# Patient Record
Sex: FEMALE | Race: WHITE | NOT HISPANIC OR LATINO | Employment: STUDENT | URBAN - NONMETROPOLITAN AREA
[De-identification: names, ages, dates, MRNs, and addresses within clinical notes are randomized per-mention and may not be internally consistent; named-entity substitution may affect disease eponyms.]

---

## 2023-02-24 PROBLEM — E28.2 POLYCYSTIC OVARIAN SYNDROME: Status: ACTIVE | Noted: 2023-02-24

## 2023-02-24 PROBLEM — G43.109 MIGRAINE WITH AURA AND WITHOUT STATUS MIGRAINOSUS, NOT INTRACTABLE: Status: ACTIVE | Noted: 2023-02-24

## 2023-02-24 PROBLEM — J30.9 ALLERGIC RHINITIS: Status: ACTIVE | Noted: 2023-02-24

## 2023-02-24 PROBLEM — J02.9 SORE THROAT: Status: ACTIVE | Noted: 2023-02-24

## 2023-02-24 PROBLEM — S62.339A BOXERS FRACTURE: Status: ACTIVE | Noted: 2023-02-24

## 2023-02-24 PROBLEM — F41.8 DEPRESSION WITH ANXIETY: Status: ACTIVE | Noted: 2023-02-24

## 2023-02-24 PROBLEM — R07.89 ATYPICAL CHEST PAIN: Status: ACTIVE | Noted: 2023-02-24

## 2023-02-24 PROBLEM — N91.3 PRIMARY OLIGOMENORRHEA: Status: ACTIVE | Noted: 2023-02-24

## 2023-02-24 PROBLEM — J02.0 STREPTOCOCCUS PHARYNGITIS: Status: ACTIVE | Noted: 2023-02-24

## 2023-02-24 RX ORDER — ETONOGESTREL 68 MG/1
1 IMPLANT SUBCUTANEOUS
COMMUNITY
Start: 2022-05-09 | End: 2025-05-09

## 2023-02-24 RX ORDER — NORTRIPTYLINE HYDROCHLORIDE 10 MG/1
1 CAPSULE ORAL NIGHTLY
COMMUNITY
Start: 2022-05-12 | End: 2024-02-13

## 2023-02-24 RX ORDER — VENLAFAXINE HYDROCHLORIDE 75 MG/1
1 CAPSULE, EXTENDED RELEASE ORAL DAILY
COMMUNITY
Start: 2021-07-01 | End: 2023-09-05

## 2023-03-09 ENCOUNTER — OFFICE VISIT (OUTPATIENT)
Dept: PRIMARY CARE | Facility: CLINIC | Age: 18
End: 2023-03-09
Payer: COMMERCIAL

## 2023-03-09 VITALS
HEART RATE: 88 BPM | BODY MASS INDEX: 35.3 KG/M2 | DIASTOLIC BLOOD PRESSURE: 70 MMHG | HEIGHT: 63 IN | SYSTOLIC BLOOD PRESSURE: 100 MMHG | WEIGHT: 199.2 LBS | OXYGEN SATURATION: 100 %

## 2023-03-09 DIAGNOSIS — M25.561 ACUTE PAIN OF RIGHT KNEE: Primary | ICD-10-CM

## 2023-03-09 PROCEDURE — 99213 OFFICE O/P EST LOW 20 MIN: CPT | Performed by: FAMILY MEDICINE

## 2023-03-09 PROCEDURE — 1036F TOBACCO NON-USER: CPT | Performed by: FAMILY MEDICINE

## 2023-03-09 ASSESSMENT — ENCOUNTER SYMPTOMS
TINGLING: 0
NUMBNESS: 0
INABILITY TO BEAR WEIGHT: 0
LOSS OF MOTION: 0
LOSS OF SENSATION: 0
MUSCLE WEAKNESS: 0

## 2023-03-09 NOTE — PROGRESS NOTES
"Subjective   Patient ID: Faby Nielson is a 18 y.o. female who presents for Knee Pain (B/L x2.5 MO).    Knee Pain   The incident occurred more than 1 week ago. The incident occurred at the gym. Injury mechanism: was doing tumbeling drill. The pain is present in the right knee. The quality of the pain is described as aching and shooting. The pain is at a severity of 6/10. The pain is mild. The pain has been Intermittent since onset. Pertinent negatives include no inability to bear weight, loss of motion, loss of sensation, muscle weakness, numbness or tingling. She has tried NSAIDs and ice for the symptoms.    Has tried bracing and KT tape, has always had some pain in knees, had an injury in 2020, felt like knee cap popped out of place.      Review of Systems   Neurological:  Negative for tingling and numbness.       Objective   /70   Pulse 88   Ht 1.6 m (5' 3\")   Wt 90.4 kg (199 lb 3.2 oz)   SpO2 100%   BMI 35.29 kg/m²     Physical Exam  Musculoskeletal:      Right knee: No swelling, deformity, effusion or crepitus. Normal range of motion. No LCL laxity, MCL laxity, ACL laxity or PCL laxity. Abnormal patellar mobility. Normal meniscus. Normal pulse.      Instability Tests: Anterior drawer test negative. Posterior drawer test negative. Anterior Lachman test negative. Medial Lisa test negative and lateral Lisa test negative.      Left knee: Normal.      Instability Tests: Anterior drawer test negative. Posterior drawer test negative. Anterior Lachman test negative. Medial Lisa test negative and lateral Lisa test negative.      Comments: Some laxity to the patella, mild pain with Apley's compression for the anterior pressure to the patella         Assessment/Plan   Problem List Items Addressed This Visit    None  Visit Diagnoses       Acute pain of right knee    -  Primary          Patient with a history of probable patellar dislocation in 2020, with had a pain in her right knee due to " increasing to tumbling incident in January 2023 patient's examination suggest an issue with her patella, will check an x-ray with sunrise views, refer to physical therapy for stability exercises, continue to use a compression sleeve with exercise, if not helping will consider referral to orthopedics

## 2023-04-06 ENCOUNTER — TELEPHONE (OUTPATIENT)
Dept: PRIMARY CARE | Facility: CLINIC | Age: 18
End: 2023-04-06
Payer: COMMERCIAL

## 2023-04-06 DIAGNOSIS — M25.561 RIGHT KNEE PAIN, UNSPECIFIED CHRONICITY: Primary | ICD-10-CM

## 2023-04-06 DIAGNOSIS — M25.561 ACUTE PAIN OF RIGHT KNEE: ICD-10-CM

## 2023-04-07 ENCOUNTER — TELEPHONE (OUTPATIENT)
Dept: PRIMARY CARE | Facility: CLINIC | Age: 18
End: 2023-04-07
Payer: COMMERCIAL

## 2023-04-10 ENCOUNTER — TELEPHONE (OUTPATIENT)
Dept: PRIMARY CARE | Facility: CLINIC | Age: 18
End: 2023-04-10
Payer: COMMERCIAL

## 2023-05-19 ENCOUNTER — OFFICE VISIT (OUTPATIENT)
Dept: PRIMARY CARE | Facility: CLINIC | Age: 18
End: 2023-05-19
Payer: COMMERCIAL

## 2023-05-19 VITALS
HEIGHT: 63 IN | WEIGHT: 199.3 LBS | DIASTOLIC BLOOD PRESSURE: 70 MMHG | BODY MASS INDEX: 35.31 KG/M2 | OXYGEN SATURATION: 96 % | HEART RATE: 81 BPM | SYSTOLIC BLOOD PRESSURE: 100 MMHG

## 2023-05-19 DIAGNOSIS — F41.8 DEPRESSION WITH ANXIETY: Primary | ICD-10-CM

## 2023-05-19 DIAGNOSIS — E66.01 SEVERE OBESITY (BMI 35.0-35.9 WITH COMORBIDITY) (MULTI): ICD-10-CM

## 2023-05-19 DIAGNOSIS — G43.109 MIGRAINE WITH AURA AND WITHOUT STATUS MIGRAINOSUS, NOT INTRACTABLE: ICD-10-CM

## 2023-05-19 PROBLEM — J02.9 SORE THROAT: Status: RESOLVED | Noted: 2023-02-24 | Resolved: 2023-05-19

## 2023-05-19 PROBLEM — R07.89 ATYPICAL CHEST PAIN: Status: RESOLVED | Noted: 2023-02-24 | Resolved: 2023-05-19

## 2023-05-19 PROBLEM — S62.339A BOXERS FRACTURE: Status: RESOLVED | Noted: 2023-02-24 | Resolved: 2023-05-19

## 2023-05-19 PROCEDURE — 1036F TOBACCO NON-USER: CPT | Performed by: FAMILY MEDICINE

## 2023-05-19 PROCEDURE — 3008F BODY MASS INDEX DOCD: CPT | Performed by: FAMILY MEDICINE

## 2023-05-19 PROCEDURE — 99213 OFFICE O/P EST LOW 20 MIN: CPT | Performed by: FAMILY MEDICINE

## 2023-05-19 ASSESSMENT — ENCOUNTER SYMPTOMS
NUMBNESS: 0
ABDOMINAL PAIN: 0
ARTHRALGIAS: 1
ACTIVITY CHANGE: 0
LIGHT-HEADEDNESS: 0
RHINORRHEA: 0
APPETITE CHANGE: 0
PALPITATIONS: 0
SHORTNESS OF BREATH: 0
WHEEZING: 0
NAUSEA: 0
DIARRHEA: 0
ABDOMINAL DISTENTION: 0
UNEXPECTED WEIGHT CHANGE: 0
HEADACHES: 0
DIFFICULTY URINATING: 0
ADENOPATHY: 0
DIZZINESS: 0
NERVOUS/ANXIOUS: 1
COUGH: 0
TROUBLE SWALLOWING: 0
FATIGUE: 0
VOMITING: 0
CONSTIPATION: 0

## 2023-05-19 NOTE — PROGRESS NOTES
"Subjective   Patient ID: Faby Nielson is a 18 y.o. female who presents for 6 MO F/U.    HPI   Knee pain still an issue, doing PT (not much help), using Meloxicam   Migraines better, has not had much recently, Nurtec helps when uses  Sleeping OK at night, using melatonin at night  Emotionally feeling anxious at times, having vivid dreams  School passed all but one class       Review of Systems   Constitutional:  Negative for activity change, appetite change, fatigue and unexpected weight change.   HENT:  Negative for ear pain, nosebleeds, rhinorrhea, sneezing and trouble swallowing.    Respiratory:  Negative for cough, shortness of breath and wheezing.    Cardiovascular:  Negative for chest pain, palpitations and leg swelling.   Gastrointestinal:  Negative for abdominal distention, abdominal pain, constipation, diarrhea, nausea and vomiting.   Genitourinary:  Negative for difficulty urinating.   Musculoskeletal:  Positive for arthralgias.   Skin:  Negative for rash.   Neurological:  Negative for dizziness, light-headedness, numbness and headaches.   Hematological:  Negative for adenopathy.   Psychiatric/Behavioral:  Negative for behavioral problems. The patient is nervous/anxious.    All other systems reviewed and are negative.      Objective   /70   Pulse 81   Ht 1.6 m (5' 3\")   Wt 90.4 kg (199 lb 4.8 oz)   SpO2 96%   BMI 35.30 kg/m²     Physical Exam  Vitals and nursing note reviewed.   Constitutional:       Appearance: Normal appearance.   Cardiovascular:      Rate and Rhythm: Normal rate and regular rhythm.      Pulses: Normal pulses.      Heart sounds: Normal heart sounds.   Pulmonary:      Effort: Pulmonary effort is normal.      Breath sounds: Normal breath sounds.   Neurological:      Mental Status: She is alert.   Psychiatric:         Mood and Affect: Mood normal.         Behavior: Behavior normal.         Assessment/Plan   Problem List Items Addressed This Visit          Other    Depression " with anxiety - Primary     Emotionally stable currently, some life stressors, continue with current medication.         Relevant Orders    Follow Up In Primary Care    Migraine with aura and without status migrainosus, not intractable     Currently stable, much improved with Nurtec, no side effects of medication.  Sleeping okay at night.         Relevant Orders    Follow Up In Primary Care

## 2023-05-19 NOTE — ASSESSMENT & PLAN NOTE
Currently stable, much improved with Nurtec, no side effects of medication.  Sleeping okay at night.

## 2023-05-22 PROBLEM — E66.01 SEVERE OBESITY (BMI 35.0-35.9 WITH COMORBIDITY) (MULTI): Status: ACTIVE | Noted: 2023-05-22

## 2023-09-01 DIAGNOSIS — F41.8 DEPRESSION WITH ANXIETY: ICD-10-CM

## 2023-09-05 RX ORDER — VENLAFAXINE HYDROCHLORIDE 75 MG/1
75 CAPSULE, EXTENDED RELEASE ORAL DAILY
Qty: 90 CAPSULE | Refills: 3 | Status: SHIPPED | OUTPATIENT
Start: 2023-09-05

## 2023-11-20 ENCOUNTER — OFFICE VISIT (OUTPATIENT)
Dept: PRIMARY CARE | Facility: CLINIC | Age: 18
End: 2023-11-20
Payer: COMMERCIAL

## 2023-11-20 VITALS
DIASTOLIC BLOOD PRESSURE: 70 MMHG | SYSTOLIC BLOOD PRESSURE: 100 MMHG | HEART RATE: 89 BPM | WEIGHT: 199.9 LBS | BODY MASS INDEX: 35.42 KG/M2 | OXYGEN SATURATION: 96 % | HEIGHT: 63 IN

## 2023-11-20 DIAGNOSIS — G43.109 MIGRAINE WITH AURA AND WITHOUT STATUS MIGRAINOSUS, NOT INTRACTABLE: ICD-10-CM

## 2023-11-20 DIAGNOSIS — F41.8 DEPRESSION WITH ANXIETY: ICD-10-CM

## 2023-11-20 PROCEDURE — 1036F TOBACCO NON-USER: CPT | Performed by: FAMILY MEDICINE

## 2023-11-20 PROCEDURE — 99213 OFFICE O/P EST LOW 20 MIN: CPT | Performed by: FAMILY MEDICINE

## 2023-11-20 PROCEDURE — 3008F BODY MASS INDEX DOCD: CPT | Performed by: FAMILY MEDICINE

## 2023-11-20 ASSESSMENT — ENCOUNTER SYMPTOMS
SLEEP DISTURBANCE: 1
ABDOMINAL PAIN: 0
CHEST TIGHTNESS: 0
ARTHRALGIAS: 0
AGITATION: 0
SHORTNESS OF BREATH: 0
DYSPHORIC MOOD: 0
COUGH: 0
HEADACHES: 0
NERVOUS/ANXIOUS: 0
VOMITING: 0
DECREASED CONCENTRATION: 0
PALPITATIONS: 0
CONSTIPATION: 0
FATIGUE: 0
DIARRHEA: 0
ACTIVITY CHANGE: 0
APPETITE CHANGE: 0
NAUSEA: 0

## 2023-11-20 NOTE — ASSESSMENT & PLAN NOTE
Emotionally seems to be stable, doing well in school, anxiety is increased when she goes home, no change with medication currently.

## 2023-11-20 NOTE — ASSESSMENT & PLAN NOTE
Migraine headaches seem to be controlled, uses medication about twice a month, frequency and intensity have both improved.

## 2023-11-20 NOTE — PATIENT INSTRUCTIONS
Try adding some fiber for gut issues, continue probiotics. Be active and work into a regular exercise routine.

## 2023-11-20 NOTE — PROGRESS NOTES
"Subjective   Patient ID: Faby Nielson is a 18 y.o. female who presents for 6 MO F/U.    HPI   Had knee surgery, doing OK, able to be active  Migraines were bad after surgery, back to normal now, using medicine as needed (1 every 2 weeks on average)  Sleeping OK at night (some days)  School doing OK (good grades)  Emotionally doing OK, sometimes at holidays or late night worse  Some irritable at times in the past few weeks  Lives in dorm on campus    Review of Systems   Constitutional:  Negative for activity change, appetite change and fatigue.   Respiratory:  Negative for cough, chest tightness and shortness of breath.    Cardiovascular:  Negative for chest pain, palpitations and leg swelling.   Gastrointestinal:  Negative for abdominal pain, constipation, diarrhea, nausea and vomiting.   Musculoskeletal:  Negative for arthralgias.   Neurological:  Negative for headaches.   Psychiatric/Behavioral:  Positive for sleep disturbance. Negative for agitation, decreased concentration and dysphoric mood. The patient is not nervous/anxious.        Current Outpatient Medications:     etonogestrel-eluting contraceptive (Nexplanon) 68 mg implant implant, Inject under the skin., Disp: , Rfl:     nortriptyline (Pamelor) 10 mg capsule, Take 1 capsule (10 mg) by mouth once daily at bedtime., Disp: , Rfl:     rimegepant (NURTEC) 75 mg tablet,disintegrating, Take 1 tablet (75 mg) by mouth once daily as needed (migraines). Only one tablet every 24H. Place on or under tongue to dissolve in mouth., Disp: , Rfl:     venlafaxine XR (Effexor-XR) 75 mg 24 hr capsule, TAKE 1 CAPSULE DAILY, Disp: 90 capsule, Rfl: 3      Objective   /70   Pulse 89   Ht 1.6 m (5' 3\")   Wt 90.7 kg (199 lb 14.4 oz)   SpO2 96%   BMI 35.41 kg/m²     Physical Exam  Vitals and nursing note reviewed.   Constitutional:       Appearance: Normal appearance.   HENT:      Head: Normocephalic and atraumatic.      Right Ear: Tympanic membrane, ear canal and " external ear normal.      Left Ear: Tympanic membrane, ear canal and external ear normal.      Nose: Nose normal.      Mouth/Throat:      Mouth: Mucous membranes are moist.      Pharynx: Oropharynx is clear.   Cardiovascular:      Rate and Rhythm: Normal rate and regular rhythm.      Pulses: Normal pulses.      Heart sounds: Normal heart sounds.   Pulmonary:      Effort: Pulmonary effort is normal.      Breath sounds: Normal breath sounds.   Musculoskeletal:      Cervical back: Normal range of motion and neck supple.   Neurological:      Mental Status: She is alert.   Psychiatric:         Mood and Affect: Mood normal.         Behavior: Behavior normal.         Assessment/Plan   Problem List Items Addressed This Visit             ICD-10-CM    Depression with anxiety F41.8     Emotionally seems to be stable, doing well in school, anxiety is increased when she goes home, no change with medication currently.         Relevant Orders    Follow Up In Primary Care - Established    Migraine with aura and without status migrainosus, not intractable G43.109     Migraine headaches seem to be controlled, uses medication about twice a month, frequency and intensity have both improved.         Relevant Orders    Follow Up In Primary Care - Established

## 2023-12-28 ENCOUNTER — TELEPHONE (OUTPATIENT)
Dept: PRIMARY CARE | Facility: CLINIC | Age: 18
End: 2023-12-28
Payer: COMMERCIAL

## 2023-12-28 NOTE — TELEPHONE ENCOUNTER
LMOM FOR PATIENT TO RETURN CALL. SHE LEFT VOICEMAIL STATING SHE NEEDS A REFILL. NEED TO KNOW THE NAME OF MEDICATION. SHE DOES NOT APPEAR TO NEED AN APPOINTMENT.

## 2023-12-29 DIAGNOSIS — G43.109 MIGRAINE WITH AURA AND WITHOUT STATUS MIGRAINOSUS, NOT INTRACTABLE: Primary | ICD-10-CM

## 2024-02-08 DIAGNOSIS — G43.109 MIGRAINE WITH AURA, NOT INTRACTABLE, WITHOUT STATUS MIGRAINOSUS: ICD-10-CM

## 2024-02-13 RX ORDER — NORTRIPTYLINE HYDROCHLORIDE 10 MG/1
10 CAPSULE ORAL NIGHTLY
Qty: 30 CAPSULE | Refills: 11 | Status: SHIPPED | OUTPATIENT
Start: 2024-02-13 | End: 2025-02-12

## 2024-03-22 ENCOUNTER — LAB (OUTPATIENT)
Dept: LAB | Facility: LAB | Age: 19
End: 2024-03-22
Payer: COMMERCIAL

## 2024-03-22 ENCOUNTER — OFFICE VISIT (OUTPATIENT)
Dept: PRIMARY CARE | Facility: CLINIC | Age: 19
End: 2024-03-22
Payer: COMMERCIAL

## 2024-03-22 VITALS
DIASTOLIC BLOOD PRESSURE: 70 MMHG | HEIGHT: 63 IN | WEIGHT: 199.3 LBS | OXYGEN SATURATION: 97 % | HEART RATE: 88 BPM | SYSTOLIC BLOOD PRESSURE: 104 MMHG | BODY MASS INDEX: 35.31 KG/M2

## 2024-03-22 DIAGNOSIS — R53.83 FATIGUE, UNSPECIFIED TYPE: ICD-10-CM

## 2024-03-22 DIAGNOSIS — J02.9 ACUTE SORE THROAT: Primary | ICD-10-CM

## 2024-03-22 DIAGNOSIS — J02.9 ACUTE SORE THROAT: ICD-10-CM

## 2024-03-22 LAB
ALBUMIN SERPL BCP-MCNC: 4.3 G/DL (ref 3.4–5)
ALP SERPL-CCNC: 102 U/L (ref 33–110)
ALT SERPL W P-5'-P-CCNC: 20 U/L (ref 7–45)
ANION GAP SERPL CALC-SCNC: 11 MMOL/L (ref 10–20)
AST SERPL W P-5'-P-CCNC: 17 U/L (ref 9–39)
BASOPHILS # BLD AUTO: 0.04 X10*3/UL (ref 0–0.1)
BASOPHILS NFR BLD AUTO: 0.3 %
BILIRUB SERPL-MCNC: 0.3 MG/DL (ref 0–1.2)
BUN SERPL-MCNC: 9 MG/DL (ref 6–23)
CALCIUM SERPL-MCNC: 9.7 MG/DL (ref 8.6–10.3)
CHLORIDE SERPL-SCNC: 103 MMOL/L (ref 98–107)
CO2 SERPL-SCNC: 29 MMOL/L (ref 21–32)
CREAT SERPL-MCNC: 0.7 MG/DL (ref 0.5–1.05)
EGFRCR SERPLBLD CKD-EPI 2021: >90 ML/MIN/1.73M*2
EOSINOPHIL # BLD AUTO: 0.22 X10*3/UL (ref 0–0.7)
EOSINOPHIL NFR BLD AUTO: 1.6 %
ERYTHROCYTE [DISTWIDTH] IN BLOOD BY AUTOMATED COUNT: 12.2 % (ref 11.5–14.5)
GLUCOSE SERPL-MCNC: 81 MG/DL (ref 74–99)
HCT VFR BLD AUTO: 45.5 % (ref 36–46)
HGB BLD-MCNC: 14.4 G/DL (ref 12–16)
IMM GRANULOCYTES # BLD AUTO: 0.03 X10*3/UL (ref 0–0.7)
IMM GRANULOCYTES NFR BLD AUTO: 0.2 % (ref 0–0.9)
LYMPHOCYTES # BLD AUTO: 1.99 X10*3/UL (ref 1.2–4.8)
LYMPHOCYTES NFR BLD AUTO: 14.2 %
MCH RBC QN AUTO: 28.5 PG (ref 26–34)
MCHC RBC AUTO-ENTMCNC: 31.6 G/DL (ref 32–36)
MCV RBC AUTO: 90 FL (ref 80–100)
MONOCYTES # BLD AUTO: 0.96 X10*3/UL (ref 0.1–1)
MONOCYTES NFR BLD AUTO: 6.9 %
NEUTROPHILS # BLD AUTO: 10.74 X10*3/UL (ref 1.2–7.7)
NEUTROPHILS NFR BLD AUTO: 76.8 %
NRBC BLD-RTO: 0 /100 WBCS (ref 0–0)
PLATELET # BLD AUTO: 395 X10*3/UL (ref 150–450)
POTASSIUM SERPL-SCNC: 4.1 MMOL/L (ref 3.5–5.3)
PROT SERPL-MCNC: 7.2 G/DL (ref 6.4–8.2)
RBC # BLD AUTO: 5.05 X10*6/UL (ref 4–5.2)
SODIUM SERPL-SCNC: 139 MMOL/L (ref 136–145)
WBC # BLD AUTO: 14 X10*3/UL (ref 4.4–11.3)

## 2024-03-22 PROCEDURE — 86664 EPSTEIN-BARR NUCLEAR ANTIGEN: CPT

## 2024-03-22 PROCEDURE — 99213 OFFICE O/P EST LOW 20 MIN: CPT | Performed by: FAMILY MEDICINE

## 2024-03-22 PROCEDURE — 36415 COLL VENOUS BLD VENIPUNCTURE: CPT

## 2024-03-22 PROCEDURE — 80053 COMPREHEN METABOLIC PANEL: CPT

## 2024-03-22 PROCEDURE — 3008F BODY MASS INDEX DOCD: CPT | Performed by: FAMILY MEDICINE

## 2024-03-22 PROCEDURE — 85025 COMPLETE CBC W/AUTO DIFF WBC: CPT

## 2024-03-22 PROCEDURE — 86665 EPSTEIN-BARR CAPSID VCA: CPT

## 2024-03-22 PROCEDURE — 1036F TOBACCO NON-USER: CPT | Performed by: FAMILY MEDICINE

## 2024-03-22 PROCEDURE — 86663 EPSTEIN-BARR ANTIBODY: CPT

## 2024-03-22 RX ORDER — PREDNISONE 10 MG/1
TABLET ORAL
Qty: 30 TABLET | Refills: 0 | Status: SHIPPED | OUTPATIENT
Start: 2024-03-22 | End: 2024-04-02

## 2024-03-22 ASSESSMENT — ENCOUNTER SYMPTOMS
CONSTIPATION: 0
FATIGUE: 1
COUGH: 0
FEVER: 0
DIARRHEA: 0
SINUS PRESSURE: 0
NAUSEA: 0
PALPITATIONS: 0
SHORTNESS OF BREATH: 0
SORE THROAT: 1
CHILLS: 0
APPETITE CHANGE: 0
TROUBLE SWALLOWING: 1
CHEST TIGHTNESS: 0
RHINORRHEA: 1
ABDOMINAL PAIN: 0
HEADACHES: 1
SINUS PAIN: 0
VOMITING: 0
ACTIVITY CHANGE: 0

## 2024-03-22 NOTE — PROGRESS NOTES
"Subjective   Patient ID: Faby Nielson is a 19 y.o. female who presents for Swollen Tonsils x2days.    HPI   Tonsils swollen, hurts to drink water, worse in the past 2 days, + achy and body aches, ears hurt  + fatigue and RN/congestion    Review of Systems   Constitutional:  Positive for fatigue. Negative for activity change, appetite change, chills and fever.   HENT:  Positive for congestion, rhinorrhea, sore throat and trouble swallowing. Negative for postnasal drip, sinus pressure, sinus pain and sneezing.    Respiratory:  Negative for cough, chest tightness and shortness of breath.    Cardiovascular:  Negative for chest pain, palpitations and leg swelling.   Gastrointestinal:  Negative for abdominal pain, constipation, diarrhea, nausea and vomiting.   Neurological:  Positive for headaches.       Objective   /70   Pulse 88   Ht 1.6 m (5' 3\")   Wt 90.4 kg (199 lb 4.8 oz)   SpO2 97%   BMI 35.30 kg/m²     Physical Exam  Vitals and nursing note reviewed.   Constitutional:       Appearance: Normal appearance.   HENT:      Head: Normocephalic and atraumatic.      Right Ear: Tympanic membrane, ear canal and external ear normal.      Left Ear: Tympanic membrane, ear canal and external ear normal.      Nose: Nose normal.      Mouth/Throat:      Mouth: Mucous membranes are moist.      Pharynx: Oropharynx is clear. Posterior oropharyngeal erythema present.      Comments: Tonsils +3, erythema, no exudate, soft palate and uvula also slightly red.  Rapid strep testing negative, there is some palpable cervical lymphadenopathy.  Cardiovascular:      Rate and Rhythm: Normal rate and regular rhythm.      Pulses: Normal pulses.      Heart sounds: Normal heart sounds.   Pulmonary:      Effort: Pulmonary effort is normal.      Breath sounds: Normal breath sounds.   Abdominal:      General: Abdomen is flat. Bowel sounds are normal. There is no distension.      Palpations: Abdomen is soft. There is no mass.      " Tenderness: There is no abdominal tenderness.   Musculoskeletal:      Cervical back: Normal range of motion and neck supple.   Neurological:      Mental Status: She is alert.   Psychiatric:         Mood and Affect: Mood normal.         Behavior: Behavior normal.         Assessment/Plan   Problem List Items Addressed This Visit    None  Visit Diagnoses         Codes    Acute sore throat    -  Primary J02.9    Suspect possible mononucleosis, check laboratory testing, placed on prednisone taper.     Relevant Medications    predniSONE (Deltasone) 10 mg tablet    Other Relevant Orders    CBC and Auto Differential    Comprehensive Metabolic Panel    Brendan-Barr virus VCA antibody panel    Fatigue, unspecified type     R53.83    Relevant Medications    predniSONE (Deltasone) 10 mg tablet    Other Relevant Orders    CBC and Auto Differential    Comprehensive Metabolic Panel    Brendan-Barr virus VCA antibody panel

## 2024-03-22 NOTE — PATIENT INSTRUCTIONS
Use Tylenol as needed for pain, increase fluids, rest, chloraseptic throat spray, salt water gargles, throat lozenges.

## 2024-03-23 LAB
EBV EA IGG SER QL: NEGATIVE
EBV NA AB SER QL: POSITIVE
EBV VCA IGG SER IA-ACNC: POSITIVE
EBV VCA IGM SER IA-ACNC: NEGATIVE

## 2024-03-26 NOTE — RESULT ENCOUNTER NOTE
Please let the patient know that her blood testing suggest that she may be recovering from mononucleosis.  How is the patient feeling compared to last week?

## 2024-03-28 ENCOUNTER — TELEPHONE (OUTPATIENT)
Dept: PRIMARY CARE | Facility: CLINIC | Age: 19
End: 2024-03-28
Payer: COMMERCIAL

## 2024-03-28 NOTE — TELEPHONE ENCOUNTER
Maik Low MD  [id number] [place] [id number] Clinical Support Staff  Please let the patient know that her blood testing suggest that she may be recovering from mononucleosis.  How is the patient feeling compared to last week?    LMOM FOR PATIENT TO CALL OFFICE.

## 2024-05-13 ENCOUNTER — OFFICE VISIT (OUTPATIENT)
Dept: OBSTETRICS AND GYNECOLOGY | Facility: CLINIC | Age: 19
End: 2024-05-13
Payer: COMMERCIAL

## 2024-05-13 VITALS
DIASTOLIC BLOOD PRESSURE: 70 MMHG | WEIGHT: 204.2 LBS | HEIGHT: 63 IN | SYSTOLIC BLOOD PRESSURE: 112 MMHG | BODY MASS INDEX: 36.18 KG/M2

## 2024-05-13 DIAGNOSIS — Z01.419 ENCOUNTER FOR GYNECOLOGICAL EXAMINATION WITHOUT ABNORMAL FINDING: ICD-10-CM

## 2024-05-13 DIAGNOSIS — Z12.4 ENCOUNTER FOR SCREENING FOR CERVICAL CANCER: ICD-10-CM

## 2024-05-13 PROCEDURE — 1036F TOBACCO NON-USER: CPT | Performed by: OBSTETRICS & GYNECOLOGY

## 2024-05-13 PROCEDURE — 99395 PREV VISIT EST AGE 18-39: CPT | Performed by: OBSTETRICS & GYNECOLOGY

## 2024-05-13 PROCEDURE — 88175 CYTOPATH C/V AUTO FLUID REDO: CPT

## 2024-05-13 PROCEDURE — 3008F BODY MASS INDEX DOCD: CPT | Performed by: OBSTETRICS & GYNECOLOGY

## 2024-05-13 RX ORDER — FLUOXETINE HYDROCHLORIDE 20 MG/1
CAPSULE ORAL
COMMUNITY
Start: 2020-03-31 | End: 2024-05-22 | Stop reason: ALTCHOICE

## 2024-05-13 ASSESSMENT — PAIN SCALES - GENERAL: PAINLEVEL: 0-NO PAIN

## 2024-05-13 NOTE — PROGRESS NOTES
"Faby Nielson is a 19 y.o. female who is here for a routine exam. PCP = Maik Low MD    Chief Complaint   Patient presents with    Gynecologic Exam     Patient is here for yearly exam and pap test. Patient does not do regular self breast exams and has no concerns at this time. LMP: Nexplanon.          Presents for annual exam. She voices no complaints and is doing well. Denies any bowel or bladder problems. Denies any breast problems.  She has a Nexplanon in place that was inserted in May 2022.    OB History          0    Para   0    Term   0       0    AB   0    Living   0         SAB   0    IAB   0    Ectopic   0    Multiple   0    Live Births   0                  Social History     Substance and Sexual Activity   Sexual Activity Defer     Current contraception: Nexplanon    Past Medical History:   Diagnosis Date    Migraine, unspecified, not intractable, without status migrainosus     Migraines    Other conditions influencing health status     Menstruation    PCOS (polycystic ovarian syndrome)     Presence of (intrauterine) contraceptive device     Nexplanon in place       Past Surgical History:   Procedure Laterality Date    KNEE ARTHROSCOPY, MEDIAL PATELLO FEMORAL LIGAMENT REPAIR Right 2023       Past med hx and past surg hx reviewed and notable for: none    Review of Systems:   Constitutional: No fever or chills  Respiratory: No shortness of breath, or cough  Cardiovascular: No chest pain or syncope  Breasts: No breast pain, no masses, no nipple discharge  Gastrointestinal: No nausea, vomiting, or diarrhea, no abdominal pain  Genitourinary: No dysuria or frequency  Gynecology: Negative except as noted in history of present illness  All other: All other systems reviewed and negative for complaint    Objective   /70   Ht 1.6 m (5' 3\")   Wt 92.6 kg (204 lb 3.2 oz)   BMI 36.17 kg/m²     PHYSICAL EXAMINATION:  Well-developed, well nourished, in no acute distress, alert and " oriented x three, is pleasant and cooperative.   HEENT: Clear. Pupils equal, round and reactive to light and accommodation. Extraocular muscles are intact. Oral mucosa pink without exudate.   NECK: No lymphadenopathy, no thyromegaly.  BREASTS: Symmetric, no palpable masses. No nipple discharge or retraction.  LUNGS: Clear bilaterally.  HEART: Regular rate and rhythm without murmurs.  ABDOMEN: Normoactive bowel sounds, soft and nontender, no guarding or rebound tenderness, no CVA tenderness.  EXTREMITIES: No clubbing, cyanosis or edema.  NEUROLOGIC:  Cranial nerves II-XII grossly intact.  :  Normal external female genitalia, normal vulva, normal vagina. Normal urethral meatus, urethra and bladder. Normal appearing cervix. Normal-sized uterus, no adnexal masses or tenderness. Pap smear performed today.      Actions performed during this visit include:  - Clinical breast exam  - Clinical pelvic exam  - No orders of the defined types were placed in this encounter.       Problem List Items Addressed This Visit    None  Visit Diagnoses       Encounter for gynecological examination without abnormal finding        Relevant Orders    THINPREP PAP TEST    Encounter for screening for cervical cancer        Relevant Orders    THINPREP PAP TEST             Provider Impression:  1.  Annual    Thank you for coming to your annual exam. Your findings during the exam were normal.  Please return for your next visit in 1 year.

## 2024-05-21 LAB
CYTOLOGY CMNT CVX/VAG CYTO-IMP: NORMAL
LAB AP HPV GENOTYPE QUESTION: YES
LAB AP HPV HR: NORMAL
LABORATORY COMMENT REPORT: NORMAL
PATH REPORT.TOTAL CANCER: NORMAL

## 2024-05-22 ENCOUNTER — OFFICE VISIT (OUTPATIENT)
Dept: PRIMARY CARE | Facility: CLINIC | Age: 19
End: 2024-05-22
Payer: COMMERCIAL

## 2024-05-22 VITALS
HEART RATE: 78 BPM | HEIGHT: 63 IN | OXYGEN SATURATION: 94 % | WEIGHT: 204.8 LBS | DIASTOLIC BLOOD PRESSURE: 60 MMHG | BODY MASS INDEX: 36.29 KG/M2 | SYSTOLIC BLOOD PRESSURE: 100 MMHG

## 2024-05-22 DIAGNOSIS — F41.8 DEPRESSION WITH ANXIETY: ICD-10-CM

## 2024-05-22 DIAGNOSIS — G43.109 MIGRAINE WITH AURA AND WITHOUT STATUS MIGRAINOSUS, NOT INTRACTABLE: ICD-10-CM

## 2024-05-22 PROCEDURE — 1036F TOBACCO NON-USER: CPT | Performed by: FAMILY MEDICINE

## 2024-05-22 PROCEDURE — 99213 OFFICE O/P EST LOW 20 MIN: CPT | Performed by: FAMILY MEDICINE

## 2024-05-22 ASSESSMENT — ENCOUNTER SYMPTOMS
LIGHT-HEADEDNESS: 0
ABDOMINAL PAIN: 0
UNEXPECTED WEIGHT CHANGE: 0
ARTHRALGIAS: 0
DIFFICULTY URINATING: 0
ACTIVITY CHANGE: 0
ADENOPATHY: 0
CONSTIPATION: 0
APPETITE CHANGE: 0
ABDOMINAL DISTENTION: 0
VOMITING: 0
TROUBLE SWALLOWING: 0
SLEEP DISTURBANCE: 1
WHEEZING: 0
SHORTNESS OF BREATH: 0
RHINORRHEA: 0
COUGH: 0
NERVOUS/ANXIOUS: 0
DIZZINESS: 0
DYSPHORIC MOOD: 1
NUMBNESS: 0
NAUSEA: 0
HEADACHES: 1
PALPITATIONS: 0
DIARRHEA: 0
FATIGUE: 1

## 2024-05-22 NOTE — PROGRESS NOTES
Subjective   Patient ID: Faby Nielson is a 19 y.o. female who presents for 6 MO LABS.    HPI   Working right now, school good, sleeping +/- (some nights hard to sleep), uses nortriptyline and melatonin  In at night, having headaches once a week for the past few weeks  Some increase in stress due to work and family stress  Managing OK, no motivation on weekends, no regular exercise  Some irritable at times, living at home for the summer    Review of Systems   Constitutional:  Positive for fatigue. Negative for activity change, appetite change and unexpected weight change.   HENT:  Negative for ear pain, nosebleeds, rhinorrhea, sneezing and trouble swallowing.    Respiratory:  Negative for cough, shortness of breath and wheezing.    Cardiovascular:  Negative for chest pain, palpitations and leg swelling.   Gastrointestinal:  Negative for abdominal distention, abdominal pain, constipation, diarrhea, nausea and vomiting.   Genitourinary:  Negative for difficulty urinating.   Musculoskeletal:  Negative for arthralgias.   Skin:  Negative for rash.   Neurological:  Positive for headaches. Negative for dizziness, light-headedness and numbness.   Hematological:  Negative for adenopathy.   Psychiatric/Behavioral:  Positive for dysphoric mood and sleep disturbance. Negative for behavioral problems. The patient is not nervous/anxious.    All other systems reviewed and are negative.        Current Outpatient Medications:     etonogestrel-eluting contraceptive (Nexplanon) 68 mg implant implant, 1 each by subdermal route., Disp: , Rfl:     nortriptyline (Pamelor) 10 mg capsule, Take 1 capsule (10 mg) by mouth once daily at bedtime., Disp: 30 capsule, Rfl: 11    venlafaxine XR (Effexor-XR) 75 mg 24 hr capsule, TAKE 1 CAPSULE DAILY, Disp: 90 capsule, Rfl: 3    rimegepant (Nurtec ODT) 75 mg tablet,disintegrating, Take 1 tablet (75 mg) by mouth every other day., Disp: , Rfl:       Objective   /60   Pulse 78   Ht 1.6 m (5'  "3\")   Wt 92.9 kg (204 lb 12.8 oz)   SpO2 94%   BMI 36.28 kg/m²     Physical Exam  Vitals and nursing note reviewed.   Constitutional:       Appearance: Normal appearance.   HENT:      Head: Normocephalic and atraumatic.      Right Ear: Tympanic membrane, ear canal and external ear normal.      Left Ear: Tympanic membrane, ear canal and external ear normal.      Nose: Nose normal.      Mouth/Throat:      Mouth: Mucous membranes are moist.      Pharynx: Oropharynx is clear.   Cardiovascular:      Rate and Rhythm: Normal rate and regular rhythm.      Pulses: Normal pulses.      Heart sounds: Normal heart sounds.   Pulmonary:      Effort: Pulmonary effort is normal.      Breath sounds: Normal breath sounds.   Musculoskeletal:      Cervical back: Normal range of motion and neck supple.   Neurological:      Mental Status: She is alert.   Psychiatric:         Mood and Affect: Mood normal.         Behavior: Behavior normal.         Assessment/Plan   Problem List Items Addressed This Visit             ICD-10-CM    Depression with anxiety F41.8     New seems stable, slightly dysthymic, lack of motivation to do things, able to get things done during the day, encouraged to be active and exercise, advised about diet and sleep, no change with medication currently.         Relevant Orders    Follow Up In Primary Care - Established    Migraine with aura and without status migrainosus, not intractable G43.109     Handout given on migraines, advised about strategies to reduce migraine frequency and stress reduction, no change with current treatment plan.         Relevant Orders    Follow Up In Primary Care - Established          "

## 2024-05-22 NOTE — ASSESSMENT & PLAN NOTE
Handout given on migraines, advised about strategies to reduce migraine frequency and stress reduction, no change with current treatment plan.   Yes

## 2024-05-22 NOTE — ASSESSMENT & PLAN NOTE
New seems stable, slightly dysthymic, lack of motivation to do things, able to get things done during the day, encouraged to be active and exercise, advised about diet and sleep, no change with medication currently.

## 2024-06-15 DIAGNOSIS — E28.2 POLYCYSTIC OVARIAN SYNDROME: ICD-10-CM

## 2024-06-17 RX ORDER — CLINDAMYCIN PHOSPHATE 10 UG/ML
LOTION TOPICAL 2 TIMES DAILY
Qty: 60 ML | Refills: 5 | Status: SHIPPED | OUTPATIENT
Start: 2024-06-17

## 2024-09-23 DIAGNOSIS — F41.8 DEPRESSION WITH ANXIETY: ICD-10-CM

## 2024-09-23 RX ORDER — VENLAFAXINE HYDROCHLORIDE 75 MG/1
75 CAPSULE, EXTENDED RELEASE ORAL DAILY
Qty: 90 CAPSULE | Refills: 3 | Status: SHIPPED | OUTPATIENT
Start: 2024-09-23

## 2024-11-25 ENCOUNTER — APPOINTMENT (OUTPATIENT)
Dept: PRIMARY CARE | Facility: CLINIC | Age: 19
End: 2024-11-25
Payer: COMMERCIAL

## 2024-11-25 VITALS
HEART RATE: 78 BPM | HEIGHT: 63 IN | OXYGEN SATURATION: 97 % | SYSTOLIC BLOOD PRESSURE: 130 MMHG | WEIGHT: 209.6 LBS | DIASTOLIC BLOOD PRESSURE: 84 MMHG | BODY MASS INDEX: 37.14 KG/M2

## 2024-11-25 DIAGNOSIS — F41.8 DEPRESSION WITH ANXIETY: Primary | ICD-10-CM

## 2024-11-25 DIAGNOSIS — G43.109 MIGRAINE WITH AURA AND WITHOUT STATUS MIGRAINOSUS, NOT INTRACTABLE: ICD-10-CM

## 2024-11-25 DIAGNOSIS — G43.109 MIGRAINE WITH AURA, NOT INTRACTABLE, WITHOUT STATUS MIGRAINOSUS: ICD-10-CM

## 2024-11-25 PROBLEM — J02.0 STREPTOCOCCUS PHARYNGITIS: Status: RESOLVED | Noted: 2023-02-24 | Resolved: 2024-11-25

## 2024-11-25 PROCEDURE — 1036F TOBACCO NON-USER: CPT | Performed by: FAMILY MEDICINE

## 2024-11-25 PROCEDURE — 99213 OFFICE O/P EST LOW 20 MIN: CPT | Performed by: FAMILY MEDICINE

## 2024-11-25 PROCEDURE — 3008F BODY MASS INDEX DOCD: CPT | Performed by: FAMILY MEDICINE

## 2024-11-25 RX ORDER — NORTRIPTYLINE HYDROCHLORIDE 10 MG/1
10 CAPSULE ORAL NIGHTLY
Qty: 30 CAPSULE | Refills: 11 | Status: SHIPPED | OUTPATIENT
Start: 2024-11-25 | End: 2025-11-25

## 2024-11-25 ASSESSMENT — ENCOUNTER SYMPTOMS
CONSTIPATION: 0
FATIGUE: 1
CHEST TIGHTNESS: 0
COUGH: 0
VOMITING: 0
ACTIVITY CHANGE: 0
DYSPHORIC MOOD: 0
ABDOMINAL PAIN: 0
NERVOUS/ANXIOUS: 0
NAUSEA: 0
SLEEP DISTURBANCE: 0
SHORTNESS OF BREATH: 0
PALPITATIONS: 0
APPETITE CHANGE: 0
DIARRHEA: 0
HEADACHES: 1

## 2024-11-25 NOTE — PROGRESS NOTES
"Subjective   Patient ID: Faby Nielson is a 19 y.o. female who presents for 6 MO F/U.    HPI   OK, better than last time  Sleep +/-, headaches some more frequent  School OK, one more semester  Work OK  Living on campus, irritable at times  Uses melatonin at night to help sleep  Uses Nurtec as needed for Has 1-3 a month on average  Irregular menses  +/- allergies, uses OTC allergy medicine  Some pain in shoulder      Review of Systems   Constitutional:  Positive for fatigue. Negative for activity change and appetite change.   Respiratory:  Negative for cough, chest tightness and shortness of breath.    Cardiovascular:  Negative for chest pain, palpitations and leg swelling.   Gastrointestinal:  Negative for abdominal pain, constipation, diarrhea, nausea and vomiting.   Neurological:  Positive for headaches.   Psychiatric/Behavioral:  Negative for dysphoric mood and sleep disturbance. The patient is not nervous/anxious.        Objective   /84   Pulse 78   Ht 1.6 m (5' 3\")   Wt 95.1 kg (209 lb 9.6 oz)   SpO2 97%   BMI 37.13 kg/m²     Physical Exam  Vitals and nursing note reviewed.   Constitutional:       Appearance: Normal appearance.   HENT:      Head: Normocephalic and atraumatic.      Right Ear: Tympanic membrane, ear canal and external ear normal.      Left Ear: Tympanic membrane, ear canal and external ear normal.      Nose: Nose normal.      Mouth/Throat:      Mouth: Mucous membranes are moist.      Pharynx: Oropharynx is clear.   Cardiovascular:      Rate and Rhythm: Normal rate and regular rhythm.      Pulses: Normal pulses.      Heart sounds: Normal heart sounds.   Pulmonary:      Effort: Pulmonary effort is normal.      Breath sounds: Normal breath sounds.   Musculoskeletal:      Cervical back: Normal range of motion and neck supple.   Neurological:      Mental Status: She is alert.   Psychiatric:         Mood and Affect: Mood normal.         Behavior: Behavior normal.         Assessment/Plan "   Problem List Items Addressed This Visit             ICD-10-CM    Depression with anxiety - Primary F41.8     Emotionally stable with current medications.  Doing well with school, working as well.         Relevant Orders    Follow Up In Primary Care - Established    Migraine with aura and without status migrainosus, not intractable G43.109     Stable, Nurtec helps has to use between 1-3 a month.         Relevant Orders    Follow Up In Primary Care - Established     Other Visit Diagnoses         Codes    Migraine with aura, not intractable, without status migrainosus     G43.109    Relevant Medications    nortriptyline (Pamelor) 10 mg capsule    Other Relevant Orders    Follow Up In Primary Care - Established

## 2025-01-16 DIAGNOSIS — G43.109 MIGRAINE WITH AURA AND WITHOUT STATUS MIGRAINOSUS, NOT INTRACTABLE: Primary | ICD-10-CM

## 2025-02-03 ENCOUNTER — OFFICE VISIT (OUTPATIENT)
Dept: URGENT CARE | Facility: CLINIC | Age: 20
End: 2025-02-03
Payer: COMMERCIAL

## 2025-02-03 VITALS
HEART RATE: 118 BPM | DIASTOLIC BLOOD PRESSURE: 78 MMHG | HEIGHT: 63 IN | BODY MASS INDEX: 37.03 KG/M2 | WEIGHT: 209 LBS | TEMPERATURE: 101.8 F | SYSTOLIC BLOOD PRESSURE: 130 MMHG | RESPIRATION RATE: 18 BRPM | OXYGEN SATURATION: 97 %

## 2025-02-03 DIAGNOSIS — R50.9 FEVER, UNSPECIFIED FEVER CAUSE: Primary | ICD-10-CM

## 2025-02-03 DIAGNOSIS — J10.1 INFLUENZA A: ICD-10-CM

## 2025-02-03 PROCEDURE — 3008F BODY MASS INDEX DOCD: CPT | Performed by: PHYSICIAN ASSISTANT

## 2025-02-03 PROCEDURE — 99213 OFFICE O/P EST LOW 20 MIN: CPT | Performed by: PHYSICIAN ASSISTANT

## 2025-02-03 ASSESSMENT — ENCOUNTER SYMPTOMS
FLU SYMPTOMS: 1
CHILLS: 1
FEVER: 1
SINUS PRESSURE: 1
APPETITE CHANGE: 1
ACTIVITY CHANGE: 1
ARTHRALGIAS: 1
COUGH: 1
SHORTNESS OF BREATH: 0
FATIGUE: 1

## 2025-02-03 NOTE — LETTER
February 3, 2025     Patient: Faby Nielson   YOB: 2005   Date of Visit: 2/3/2025       To Whom It May Concern:    Faby Nielson was seen in my clinic on 2/3/2025 at 12:35 pm. Please excuse Faby for her absence from school on this day to make the appointment.    If you have any questions or concerns, please don't hesitate to call.         Sincerely,         Libra Julien PA-C        CC: No Recipients

## 2025-02-03 NOTE — PROGRESS NOTES
Subjective   Patient ID: Faby Nielson is a 19 y.o. female.      Flu Symptoms  Presenting symptoms: cough, fatigue and fever    Presenting symptoms: no shortness of breath    Associated symptoms: chills and nasal congestion    Associated symptoms: no ear pain        The following portions of the chart were reviewed this encounter and updated as appropriate:       Patient presents today with complaint of upper respiratory symptoms including fever cough congestion that have been ongoing for the past 48 to 72 hours.  She is here with her boyfriend who had this same exact symptoms approximately 1 week ago.  Her boyfriend never got tested but states that he had a cough fever chills for approximately 5 to 7 days.  She is drinking well but does have a poor appetite.    Review of Systems   Constitutional:  Positive for activity change, appetite change, chills, fatigue and fever.   HENT:  Positive for congestion, sinus pressure and sneezing. Negative for ear pain and postnasal drip.    Respiratory:  Positive for cough. Negative for shortness of breath.    Cardiovascular:  Negative for chest pain.   Musculoskeletal:  Positive for arthralgias.     Objective   Physical Exam  Constitutional:       Appearance: Normal appearance.   HENT:      Head: Normocephalic and atraumatic.      Right Ear: Tympanic membrane normal.      Left Ear: Tympanic membrane normal.      Nose: Nose normal.      Mouth/Throat:      Mouth: Mucous membranes are dry.   Cardiovascular:      Rate and Rhythm: Normal rate and regular rhythm.   Pulmonary:      Effort: Pulmonary effort is normal.      Breath sounds: Normal breath sounds.   Abdominal:      Palpations: Abdomen is soft.   Skin:     General: Skin is warm and dry.   Neurological:      Mental Status: She is alert.       Procedures    Assessment/Plan   Diagnoses and all orders for this visit:  Fever, unspecified fever cause  -     POCT SARS-COV-2 PCR manually resulted  -     POCT FLU A/B PCR manually  resulted  Influenza A    Patient is 72 hours into her symptoms and Tamiflu is unlikely to be helpful for her.  Advised her to continue using over-the-counter medication to help with symptom management.  Stay hydrated and plenty of rest.  May use Tylenol or Motrin as needed to help with symptom management.  Return to clinic if symptoms do not improve over the next few days.  School excuse provided today.    Patient disposition: Home

## 2025-04-25 ENCOUNTER — PROCEDURE VISIT (OUTPATIENT)
Facility: CLINIC | Age: 20
End: 2025-04-25
Payer: COMMERCIAL

## 2025-04-25 VITALS
SYSTOLIC BLOOD PRESSURE: 108 MMHG | HEIGHT: 63 IN | BODY MASS INDEX: 36.64 KG/M2 | WEIGHT: 206.8 LBS | DIASTOLIC BLOOD PRESSURE: 62 MMHG

## 2025-04-25 DIAGNOSIS — Z30.46 ENCOUNTER FOR SURVEILLANCE OF NEXPLANON SUBDERMAL CONTRACEPTIVE: Primary | ICD-10-CM

## 2025-04-25 RX ORDER — LIDOCAINE HYDROCHLORIDE 10 MG/ML
3 INJECTION, SOLUTION INFILTRATION; PERINEURAL ONCE
Status: COMPLETED | OUTPATIENT
Start: 2025-04-25 | End: 2025-04-25

## 2025-04-25 RX ADMIN — LIDOCAINE HYDROCHLORIDE 3 ML: 10 INJECTION, SOLUTION INFILTRATION; PERINEURAL at 14:59

## 2025-04-25 SDOH — ECONOMIC STABILITY: FOOD INSECURITY: WITHIN THE PAST 12 MONTHS, THE FOOD YOU BOUGHT JUST DIDN'T LAST AND YOU DIDN'T HAVE MONEY TO GET MORE.: NEVER TRUE

## 2025-04-25 SDOH — ECONOMIC STABILITY: INCOME INSECURITY: IN THE LAST 12 MONTHS, WAS THERE A TIME WHEN YOU WERE NOT ABLE TO PAY THE MORTGAGE OR RENT ON TIME?: NO

## 2025-04-25 SDOH — ECONOMIC STABILITY: FOOD INSECURITY: WITHIN THE PAST 12 MONTHS, YOU WORRIED THAT YOUR FOOD WOULD RUN OUT BEFORE YOU GOT MONEY TO BUY MORE.: NEVER TRUE

## 2025-04-25 SDOH — ECONOMIC STABILITY: TRANSPORTATION INSECURITY
IN THE PAST 12 MONTHS, HAS LACK OF TRANSPORTATION KEPT YOU FROM MEETINGS, WORK, OR FROM GETTING THINGS NEEDED FOR DAILY LIVING?: NO

## 2025-04-25 SDOH — ECONOMIC STABILITY: TRANSPORTATION INSECURITY
IN THE PAST 12 MONTHS, HAS THE LACK OF TRANSPORTATION KEPT YOU FROM MEDICAL APPOINTMENTS OR FROM GETTING MEDICATIONS?: NO

## 2025-04-25 ASSESSMENT — LIFESTYLE VARIABLES
HOW MANY STANDARD DRINKS CONTAINING ALCOHOL DO YOU HAVE ON A TYPICAL DAY: PATIENT DOES NOT DRINK
HOW OFTEN DO YOU HAVE A DRINK CONTAINING ALCOHOL: NEVER

## 2025-04-25 ASSESSMENT — PATIENT HEALTH QUESTIONNAIRE - PHQ9
1. LITTLE INTEREST OR PLEASURE IN DOING THINGS: NOT AT ALL
SUM OF ALL RESPONSES TO PHQ9 QUESTIONS 1 AND 2: 0
2. FEELING DOWN, DEPRESSED OR HOPELESS: NOT AT ALL

## 2025-04-25 ASSESSMENT — PAIN SCALES - GENERAL: PAINLEVEL_OUTOF10: 0-NO PAIN

## 2025-04-25 NOTE — PROGRESS NOTES
Faby Nielson is a 20 y.o. year old female patient.  PCP = Maik Low MD    Chief Complaint   Patient presents with    Procedure     Patient is here for Nexplanon removal.        HPI   Presents for removal of the Nexplanon.  The Nexplanon is in the left inner arm.  Patient states that the Nexplanon was placed higher in the inner arm since she was concerned of trauma to the implant since she is a  in gymnastics.  She is moving to [place] in 2 weeks.    OB History          0    Para   0    Term   0       0    AB   0    Living   0         SAB   0    IAB   0    Ectopic   0    Multiple   0    Live Births   0                 Medical History[1]    Surgical History[2]    Review of Systems:   Constitutional: No fever or chills  Respiratory: No shortness of breath, or cough  Cardiovascular: No chest pain or syncope  Breasts: No breast pain, no masses, no nipple discharge  Gastrointestinal: No nausea, vomiting, or diarrhea, no abdominal pain  Genitourinary: No dysuria or frequency  Gynecology: Negative except as noted in history of present illness  All other: All other systems reviewed and negative for complaint    Medication Documentation Review Audit       Reviewed by James Haney MD (Physician) on 25 at 1348      Medication Order Taking? Sig Documenting Provider Last Dose Status   clindamycin (Cleocin T) 1 % lotion [id number]  APPLY SPARINGLY TO AFFECTED AREA(S) TWICE DAILY James Haney MD  Active   etonogestrel-eluting contraceptive (Nexplanon) 68 mg implant implant [id number] No 1 each by subdermal route. Historical Provider, MD Taking Active   nortriptyline (Pamelor) 10 mg capsule [id number]  Take 1 capsule (10 mg) by mouth once daily at bedtime. Maik Low MD  Active   rimegepant (Nurtec ODT) 75 mg tablet,disintegrating [id number]  Dissolve 1 tablet (75 mg) in the mouth every other day. Maik Low MD  Active   venlafaxine XR (Effexor-XR) 75 mg 24 hr capsule  "[id number]  Take 1 capsule (75 mg) by mouth once daily. Maik Low MD  Active                     /62   Ht 1.588 m (5' 2.5\")   Wt 93.8 kg (206 lb 12.8 oz)   BMI 37.22 kg/m²     PHYSICAL EXAMINATION:  Chaperone present for exam: Jaclyn Parson LPN  General: No acute distress  Eye: Intraocular movements are intact  HEENT: Normocephalic  Respiratory: Respirations are nonlabored  Gastrointestinal: Nondistended   Musculoskeletal: Normal range of motion  Neurologic: Alert and oriented x3  Psychiatric: Cooperative, appropriate mood and affect.    EXTREMITIES: No clubbing, cyanosis or edema.  The Nexplanon is located in the left inner arm at least approximately 20 cm above the elbow.  The Nexplanon is noted to be somewhat deeper in the subcutaneous tissue.  The distal end of the Nexplanon is difficult to palpate.  The skin was locally infiltrated with 1.5 mL 1% lidocaine initially then an additional 1.5 mL of 1% lidocaine was used since she felt some discomfort during the procedure.  Skin was prepped and draped in usual sterile fashion.  A stab wound incision was made in the skin just distal to the implant.  After multiple attempts, the implant could not be brought to the incision site.  Therefore the procedure was ended.  This skin was closed using Steri-Strips and wrapped with gauze wrap.  NEUROLOGIC:  Cranial nerves II-XII grossly intact.          Problem List Items Addressed This Visit    None  Visit Diagnoses         Encounter for surveillance of Nexplanon subdermal contraceptive    -  Primary             Provider Impression:  1.  Attempted removal of Nexplanon, unsuccessful  Patient given recommendation to see a general surgeon when she moves to [place] for removal of the Nexplanon implant.             [1]   Past Medical History:  Diagnosis Date    Migraine, unspecified, not intractable, without status migrainosus     Migraines    Other conditions influencing health status     Menstruation    " PCOS (polycystic ovarian syndrome)    [2]   Past Surgical History:  Procedure Laterality Date    KNEE ARTHROSCOPY, MEDIAL PATELLO FEMORAL LIGAMENT REPAIR Right 08/04/2023

## 2025-04-28 ENCOUNTER — OFFICE VISIT (OUTPATIENT)
Dept: SURGERY | Facility: CLINIC | Age: 20
End: 2025-04-28
Payer: COMMERCIAL

## 2025-04-28 VITALS
SYSTOLIC BLOOD PRESSURE: 120 MMHG | WEIGHT: 207 LBS | HEIGHT: 63 IN | HEART RATE: 71 BPM | DIASTOLIC BLOOD PRESSURE: 76 MMHG | BODY MASS INDEX: 36.68 KG/M2

## 2025-04-28 DIAGNOSIS — M79.5 FOREIGN BODY (FB) IN SOFT TISSUE: Primary | ICD-10-CM

## 2025-04-28 PROCEDURE — 10120 INC&RMVL FB SUBQ TISS SMPL: CPT | Performed by: SURGERY

## 2025-04-28 PROCEDURE — 3008F BODY MASS INDEX DOCD: CPT | Performed by: SURGERY

## 2025-04-28 PROCEDURE — 1036F TOBACCO NON-USER: CPT | Performed by: SURGERY

## 2025-04-28 PROCEDURE — 99203 OFFICE O/P NEW LOW 30 MIN: CPT | Performed by: SURGERY

## 2025-04-28 NOTE — LETTER
2025     James Haney MD  [address] [place] [address] [facility], [address] [place] [id number]    Patient: Faby Nielson   YOB: 2005   Date of Visit: 2025       Dear Dr. James Haney MD:    Thank you for referring Faby Nielson to me for evaluation. Below are my notes for this consultation.  If you have questions, please do not hesitate to call me. I look forward to following your patient along with you.       Sincerely,     Aarti Meier MD      CC: No Recipients  ______________________________________________________________________________________    General Surgery Consultation    Patient: Faby Nielson  : 2005  MRN: [id number]  Date of Consultation: 25    Primary Care Provider: Maik Low MD  Referring Provider: James Haney MD    Chief Complaint: Foreign body in left upper extremity    History of Present Illness: Faby Nielson is a 20 y.o. old female seen at the request of Dr. Haney for removal of a foreign body from her left upper extremity.  This was placed in her left upper extremity in May 2022.  It was placed on the posterior aspect of the arm since she is a  and wanted to avoid trauma to the implant.  BMI was 34 at time of placement.  It is currently 37.  Dr. Haney attempted to remove this in his office last week, but was unsuccessful.  She is therefore referred for surgical excision.  She is moving to [place] in the near future and would like this expedited.  She is not on any blood thinners or antiplatelet agents.  She is able to palpate the implant.    Medical History:  Migraines  PCOS    Surgical History:  Right knee arthroplasty    Home Medications:  Prior to Admission medications    Medication Sig Start Date End Date Taking? Authorizing Provider   clindamycin (Cleocin T) 1 % lotion APPLY SPARINGLY TO AFFECTED AREA(S) TWICE DAILY 24   James Haney MD   nortriptyline (Pamelor) 10 mg capsule Take 1 capsule  "(10 mg) by mouth once daily at bedtime. 11/25/24 11/25/25  Maik Low MD   rimegepant (Nurtec ODT) 75 mg tablet,disintegrating Dissolve 1 tablet (75 mg) in the mouth every other day. 1/16/25   Maik Low MD   venlafaxine XR (Effexor-XR) 75 mg 24 hr capsule Take 1 capsule (75 mg) by mouth once daily. 9/23/24   Maik Low MD   etonogestrel-eluting contraceptive (Nexplanon) 68 mg implant implant 1 each by subdermal route. 5/9/22 4/25/25  Historical Provider, MD     Allergies:  Sulfa (sulfonamide antibiotics)    Family History:   Mother with depression and migraines.  Maternal grandmother and paternal grandmother both with history of colon cancer.    Social History:  Non-smoker.  No alcohol or drug use.    ROS:  Constitutional:  no fever, sweats, and chills  Cardiovascular: No chest pain  Respiratory: No cough or shortness of breath  Gastrointestinal: No abdominal pain  Genitourinary: no dysuria  Musculoskeletal: + Foreign body in left upper extremity (Nexplanon device)  Integumentary: no rashes  Neurological: no confusion  Endocrine: no heat or cold intolerance  Heme/Lymph: no easy bruising or bleeding    Objective:  /76   Pulse 71   Ht 1.588 m (5' 2.5\")   Wt 93.9 kg (207 lb)   BMI 37.26 kg/m²     Physical Exam:  Constitutional: No acute distress, conversant, pleasant  Neurologic: alert and oriented  Psych: appropriate affect  Ears, Nose, Mouth and Throat: mucus membranes moist  Pulmonary: No labored breathing  Cardiovascular: Regular rate and rhythm  Abdomen: Non-distended, BMI 37  Musculoskeletal: On the posterior aspect of her left upper extremity she had a small 5 mm incision.  Immediately underlying this incision, I could feel the tip of the Nexplanon device.  This extended cephalad from where the incision was.  When I pushed more proximally, it would deliver the tip of the foreign body very close to the incision and I could palpate the tip.  This was with her in sitting " position, as well as prone with flexion at the elbow.  Skin: no jaundice    Procedure: Removal of foreign body from left upper extremity  Description: Patient was brought to the procedure room and placed in prone positioning with 90 degree flexion at the elbow, essentially with her hand laying underneath her abdomen.  This allowed for adequate exposure where the foreign body was palpable underneath the skin.  The skin was prepped with Betadine solution.  The existing incision was extended, so that her new incision was about 1 cm in length.  I applied downward pressure at the cephalad aspect of the Nexplanon device.  This pushed the tip of the foreign body even more superficial into the incision.  A fine-tipped hemostat was used to grasp the tip of the foreign body.  Cautery was used to incise onto the foreign body, as she had developed a capsule around it.  This exposed the Nexplanon device itself.  It was regrasped with an additional hemostat, excluding the surrounding capsule.  The foreign body (Nexplanon device) was removed in its entirety with gentle traction. It remained intact. A very tiny portion of the capsule was excised sharply with scissors.  Hemostasis was obtained with direct pressure.  The skin incision was closed with a deep dermal 4-0 Vicryl suture and Steri-Strips.  A dry dressing was placed over top of this.  She tolerated the procedure well.    Labs:  Labs from 3/22/25 reviewed: Hgb 14.4, Plts 395    Imaging:  No pertinent imaging available for review.    Assessment and Plan: Faby Nielson is a 20 y.o. old female with an undesired foreign body in her left upper extremity (Nexplanon device).  I was able to remove it in the office today under local anesthetic.  I suspect that different patient positioning is what allowed this, as I had her prone positioning with flexion at the elbow, which gave good exposure/palpation.  She tolerated the procedure well.  She will leave the Steri-Strips in place  until they fall off on their own.  She can wear an outer dressing to protect her clothing from any drainage.  She will follow-up as needed.    Aarti Meier MD  4/28/2025

## 2025-04-28 NOTE — PROGRESS NOTES
General Surgery Consultation    Patient: Faby Nielson  : 2005  MRN: [id number]  Date of Consultation: 25    Primary Care Provider: Maik Low MD  Referring Provider: James Haney MD    Chief Complaint: Foreign body in left upper extremity    History of Present Illness: Faby Nielson is a 20 y.o. old female seen at the request of Dr. Haney for removal of a foreign body from her left upper extremity.  This was placed in her left upper extremity in May 2022.  It was placed on the posterior aspect of the arm since she is a  and wanted to avoid trauma to the implant.  BMI was 34 at time of placement.  It is currently 37.  Dr. Haney attempted to remove this in his office last week, but was unsuccessful.  She is therefore referred for surgical excision.  She is moving to [place] in the near future and would like this expedited.  She is not on any blood thinners or antiplatelet agents.  She is able to palpate the implant.    Medical History:  Migraines  PCOS    Surgical History:  Right knee arthroplasty    Home Medications:  Prior to Admission medications    Medication Sig Start Date End Date Taking? Authorizing Provider   clindamycin (Cleocin T) 1 % lotion APPLY SPARINGLY TO AFFECTED AREA(S) TWICE DAILY 24   James Haney MD   nortriptyline (Pamelor) 10 mg capsule Take 1 capsule (10 mg) by mouth once daily at bedtime. 24  Maik Low MD   rimegepant (Nurtec ODT) 75 mg tablet,disintegrating Dissolve 1 tablet (75 mg) in the mouth every other day. 25   Maik Low MD   venlafaxine XR (Effexor-XR) 75 mg 24 hr capsule Take 1 capsule (75 mg) by mouth once daily. 24   Maik Low MD   etonogestrel-eluting contraceptive (Nexplanon) 68 mg implant implant 1 each by subdermal route. 22  Historical Provider, MD     Allergies:  Sulfa (sulfonamide antibiotics)    Family History:   Mother with depression and  "migraines.  Maternal grandmother and paternal grandmother both with history of colon cancer.    Social History:  Non-smoker.  No alcohol or drug use.    ROS:  Constitutional:  no fever, sweats, and chills  Cardiovascular: No chest pain  Respiratory: No cough or shortness of breath  Gastrointestinal: No abdominal pain  Genitourinary: no dysuria  Musculoskeletal: + Foreign body in left upper extremity (Nexplanon device)  Integumentary: no rashes  Neurological: no confusion  Endocrine: no heat or cold intolerance  Heme/Lymph: no easy bruising or bleeding    Objective:  /76   Pulse 71   Ht 1.588 m (5' 2.5\")   Wt 93.9 kg (207 lb)   BMI 37.26 kg/m²     Physical Exam:  Constitutional: No acute distress, conversant, pleasant  Neurologic: alert and oriented  Psych: appropriate affect  Ears, Nose, Mouth and Throat: mucus membranes moist  Pulmonary: No labored breathing  Cardiovascular: Regular rate and rhythm  Abdomen: Non-distended, BMI 37  Musculoskeletal: On the posterior aspect of her left upper extremity she had a small 5 mm incision.  Immediately underlying this incision, I could feel the tip of the Nexplanon device.  This extended cephalad from where the incision was.  When I pushed more proximally, it would deliver the tip of the foreign body very close to the incision and I could palpate the tip.  This was with her in sitting position, as well as prone with flexion at the elbow.  Skin: no jaundice    Procedure: Removal of foreign body from left upper extremity  Description: Patient was brought to the procedure room and placed in prone positioning with 90 degree flexion at the elbow, essentially with her hand laying underneath her abdomen.  This allowed for adequate exposure where the foreign body was palpable underneath the skin.  The skin was prepped with Betadine solution.  The existing incision was extended, so that her new incision was about 1 cm in length.  I applied downward pressure at the cephalad " aspect of the Nexplanon device.  This pushed the tip of the foreign body even more superficial into the incision.  A fine-tipped hemostat was used to grasp the tip of the foreign body.  Cautery was used to incise onto the foreign body, as she had developed a capsule around it.  This exposed the Nexplanon device itself.  It was regrasped with an additional hemostat, excluding the surrounding capsule.  The foreign body (Nexplanon device) was removed in its entirety with gentle traction. It remained intact. A very tiny portion of the capsule was excised sharply with scissors.  Hemostasis was obtained with direct pressure.  The skin incision was closed with a deep dermal 4-0 Vicryl suture and Steri-Strips.  A dry dressing was placed over top of this.  She tolerated the procedure well.    Labs:  Labs from 3/22/25 reviewed: Hgb 14.4, Plts 395    Imaging:  No pertinent imaging available for review.    Assessment and Plan: Faby Nielson is a 20 y.o. old female with an undesired foreign body in her left upper extremity (Nexplanon device).  I was able to remove it in the office today under local anesthetic.  I suspect that different patient positioning is what allowed this, as I had her prone positioning with flexion at the elbow, which gave good exposure/palpation.  She tolerated the procedure well.  She will leave the Steri-Strips in place until they fall off on their own.  She can wear an outer dressing to protect her clothing from any drainage.  She will follow-up as needed.    Aarti Meier MD  4/28/2025

## 2025-05-14 ENCOUNTER — APPOINTMENT (OUTPATIENT)
Dept: OBSTETRICS AND GYNECOLOGY | Facility: CLINIC | Age: 20
End: 2025-05-14
Payer: COMMERCIAL

## 2025-08-20 ENCOUNTER — TELEPHONE (OUTPATIENT)
Facility: CLINIC | Age: 20
End: 2025-08-20
Payer: COMMERCIAL

## 2025-08-21 DIAGNOSIS — E28.2 POLYCYSTIC OVARIAN SYNDROME: ICD-10-CM

## 2025-08-21 RX ORDER — CLINDAMYCIN PHOSPHATE 10 UG/ML
LOTION TOPICAL 2 TIMES DAILY
Qty: 60 ML | Refills: 5 | Status: SHIPPED | OUTPATIENT
Start: 2025-08-21

## 2025-11-26 ENCOUNTER — APPOINTMENT (OUTPATIENT)
Age: 20
End: 2025-11-26
Payer: COMMERCIAL